# Patient Record
(demographics unavailable — no encounter records)

---

## 2025-06-18 NOTE — HISTORY OF PRESENT ILLNESS
[FreeTextEntry1] : Pt would like to talk about pinch nerve in left arm, numbness, tingles,  shoulder gets cramps.  plays guitar.  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 would like to discuss medications options  reports recovering alcoholic for years without a drink  Shoulder cramps and wrist pain on and off over the past year has no used carpal tunnel braces  previous injury to right great toe after injury many years ago

## 2025-06-18 NOTE — HISTORY OF PRESENT ILLNESS
[FreeTextEntry1] : Pt would like to talk about pinch nerve in left arm, numbness, tingles,  shoulder gets cramps.  plays guitar.  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 would like to discuss medications options  reports recovering alcoholic for years without a drink  Shoulder cramps and wrist pain on and off over the past year has no used carpal tunnel braces  previous injury to right great toe after injury many years ago

## 2025-06-18 NOTE — PHYSICAL EXAM
[No Acute Distress] : no acute distress [Well Nourished] : well nourished [Well Developed] : well developed [Normal Sclera/Conjunctiva] : normal sclera/conjunctiva [Normal Outer Ear/Nose] : the outer ears and nose were normal in appearance [No Respiratory Distress] : no respiratory distress  [No CVA Tenderness] : no CVA  tenderness [No Joint Swelling] : no joint swelling [No Rash] : no rash [Coordination Grossly Intact] : coordination grossly intact [Normal Affect] : the affect was normal [Normal Insight/Judgement] : insight and judgment were intact [de-identified] : Periodic discomfort and tingling of left shoulder radiating down left arm strengh equal on exam

## 2025-06-18 NOTE — PLAN
[FreeTextEntry1] : use medications as discussed  schedule appointment for referrals  get re-evaluated if concerns worsen or symptoms dont improve or concerns worsen

## 2025-06-18 NOTE — PHYSICAL EXAM
[No Acute Distress] : no acute distress [Well Nourished] : well nourished [Well Developed] : well developed [Normal Sclera/Conjunctiva] : normal sclera/conjunctiva [Normal Outer Ear/Nose] : the outer ears and nose were normal in appearance [No Respiratory Distress] : no respiratory distress  [No CVA Tenderness] : no CVA  tenderness [No Joint Swelling] : no joint swelling [No Rash] : no rash [Coordination Grossly Intact] : coordination grossly intact [Normal Affect] : the affect was normal [Normal Insight/Judgement] : insight and judgment were intact [de-identified] : Periodic discomfort and tingling of left shoulder radiating down left arm strengh equal on exam

## 2025-06-23 NOTE — PHYSICAL EXAM
[No Acute Distress] : no acute distress [Well Nourished] : well nourished [Well-Appearing] : well-appearing [No Respiratory Distress] : no respiratory distress  [Alert and Oriented x3] : oriented to person, place, and time [Normal Insight/Judgement] : insight and judgment were intact

## 2025-06-23 NOTE — HISTORY OF PRESENT ILLNESS
[Telehealth (audio & video)] : This visit was provided via telehealth using real-time 2-way audio visual technology. [Verbal consent obtained from patient] : the patient, [unfilled] [FreeTextEntry8] : Patient on tele health for follow up on new anxiety medication as well as BP follow up Patient reports he has used the Vistaril 25 mg with positive effect  home SBP readings in the 130's no chest pain shortness of breath or palpitations

## 2025-07-07 NOTE — HISTORY OF PRESENT ILLNESS
[de-identified] : 07/07/2025: Patient is a 50yo male presenting for evaluation of L arm intermittent numbness and tingling that travels from the shoulder to the thumb. It is made worse with grabbing things. He will sometimes have symptoms at night as well. 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 He has not tried anything for his 2 years of symptoms. He denies weakness.

## 2025-07-07 NOTE — PHYSICAL EXAM
[de-identified] : Neck: - No obvious deformity, swelling, or bruising - No pain with palpation of spinous processes or paraspinal musculature - ROM intact throughout flexion, extension, side bending, and rotation - 4/5 strength with L tricep extension - 5/5 strength throughout UE evaluation bilaterally otherwise - Positive Spurling Maneuver - Distally neurovascularly intact

## 2025-07-07 NOTE — REVIEW OF SYSTEMS
[Arthralgia] : no arthralgia [Joint Pain] : no joint pain [Joint Stiffness] : no joint stiffness [Joint Swelling] : no joint swelling [Negative] : Integumentary

## 2025-07-07 NOTE — ASSESSMENT
[FreeTextEntry1] : 2 year symptoms of l arm intermittent numbness and tingling. Positive spurlings and Left tricep weakness on exam. XR cervical spine showing DDD with most noted between c6 c7. - PT referral - HEP recommended - Follow up in 6-8 weeks. Consider MRI if not improving.